# Patient Record
(demographics unavailable — no encounter records)

---

## 2025-06-03 NOTE — HISTORY OF PRESENT ILLNESS
[FreeTextEntry1] : 49yo P3 here for annual exam   noted pelvic pain, deep dyspareunia known endometritis (chronic)  pt reports thyroid disorder  perimenopause w brain fog, no hot flashes but fatigue [Mammogramdate] : yesterday [ColonoscopyDate] : 2024 [TextBox_43] : polyps, f/u 3 years from last

## 2025-06-03 NOTE — PHYSICAL EXAM
[Chaperone Declined] : Chaperone offered however refused by patient, [Appropriately responsive] : appropriately responsive [Alert] : alert [No Acute Distress] : no acute distress [No Lymphadenopathy] : no lymphadenopathy [Soft] : soft [Non-tender] : non-tender [Non-distended] : non-distended [No HSM] : No HSM [No Lesions] : no lesions [No Mass] : no mass [Oriented x3] : oriented x3 [Examination Of The Breasts] : a normal appearance [] : implants [No Masses] : no breast masses were palpable [Labia Majora] : normal [Labia Minora] : normal [Normal] : normal [Uterine Adnexae] : normal [FreeTextEntry6] : implants b/l  [FreeTextEntry7] : tummy juanita pena [Tenderness] : nontender